# Patient Record
Sex: FEMALE | Race: WHITE | Employment: FULL TIME | URBAN - METROPOLITAN AREA
[De-identification: names, ages, dates, MRNs, and addresses within clinical notes are randomized per-mention and may not be internally consistent; named-entity substitution may affect disease eponyms.]

---

## 2023-07-24 ENCOUNTER — HOSPITAL ENCOUNTER (EMERGENCY)
Age: 18
Discharge: LEFT BEFORE DISCHARGE | End: 2023-07-24
Attending: EMERGENCY MEDICINE

## 2023-07-24 NOTE — ED NOTES
Pt found to be missing from waiting room at this time - registration states had left      Lamin Simon RN  07/24/23 [id number]

## 2023-07-24 NOTE — ED PROVIDER NOTES
This patient was not evaluated by ED physician.   Left without being seen     Toy Dunn To, DO  07/24/23 [id number]